# Patient Record
Sex: MALE | Race: WHITE | Employment: STUDENT | URBAN - METROPOLITAN AREA
[De-identification: names, ages, dates, MRNs, and addresses within clinical notes are randomized per-mention and may not be internally consistent; named-entity substitution may affect disease eponyms.]

---

## 2017-12-21 ENCOUNTER — APPOINTMENT (OUTPATIENT)
Dept: GENERAL RADIOLOGY | Age: 22
End: 2017-12-21
Attending: EMERGENCY MEDICINE
Payer: COMMERCIAL

## 2017-12-21 ENCOUNTER — HOSPITAL ENCOUNTER (EMERGENCY)
Age: 22
Discharge: HOME | End: 2017-12-21
Attending: EMERGENCY MEDICINE
Payer: COMMERCIAL

## 2017-12-21 VITALS
BODY MASS INDEX: 19.26 KG/M2 | TEMPERATURE: 97.9 F | WEIGHT: 130 LBS | HEART RATE: 72 BPM | DIASTOLIC BLOOD PRESSURE: 71 MMHG | HEIGHT: 69 IN | SYSTOLIC BLOOD PRESSURE: 114 MMHG | RESPIRATION RATE: 18 BRPM | OXYGEN SATURATION: 100 %

## 2017-12-21 DIAGNOSIS — S43.401A SPRAIN OF RIGHT SHOULDER, UNSPECIFIED SHOULDER SPRAIN TYPE, INITIAL ENCOUNTER: Primary | ICD-10-CM

## 2017-12-21 PROCEDURE — A4565 SLINGS: HCPCS

## 2017-12-21 PROCEDURE — 74011250637 HC RX REV CODE- 250/637: Performed by: EMERGENCY MEDICINE

## 2017-12-21 PROCEDURE — 99283 EMERGENCY DEPT VISIT LOW MDM: CPT

## 2017-12-21 PROCEDURE — 73030 X-RAY EXAM OF SHOULDER: CPT

## 2017-12-21 RX ORDER — IBUPROFEN 600 MG/1
600 TABLET ORAL
Status: COMPLETED | OUTPATIENT
Start: 2017-12-21 | End: 2017-12-21

## 2017-12-21 RX ORDER — IBUPROFEN 600 MG/1
600 TABLET ORAL
Qty: 20 TAB | Refills: 0 | Status: SHIPPED | OUTPATIENT
Start: 2017-12-21

## 2017-12-21 RX ORDER — NAPROXEN 500 MG/1
500 TABLET ORAL 2 TIMES DAILY WITH MEALS
Qty: 20 TAB | Refills: 0 | Status: SHIPPED | OUTPATIENT
Start: 2017-12-21

## 2017-12-21 RX ADMIN — IBUPROFEN 600 MG: 600 TABLET, FILM COATED ORAL at 12:18

## 2017-12-21 NOTE — ED PROVIDER NOTES
HPI Comments: 51-year-old healthy male presents to the emergency room for evaluation of right shoulder pain. Injuries sustained this morning while darkening. Patient was walking when he tripped. In the process of falling patient reached across his body to grab something to brace his fall and felt a pop in her posterior shoulder. Patient continued to. Patient came to the ER for continued pain. Pain is described as sharp and constant. It does not radiate. There is no injury to the head or neck. No loss of consciousness. No numbness or tingling in the extremity. No weakness. Patient has not taken anything for the pain. Pain is currently 8/10. Pain is made worse with movement of his arm posteriorly. No other complaints. Social hx  Nonsmoker  Immunization up to date    Patient is a 23 y.o. male presenting with shoulder injury. The history is provided by the patient. Shoulder Injury    Pertinent negatives include no numbness. History reviewed. No pertinent past medical history. History reviewed. No pertinent surgical history. History reviewed. No pertinent family history. Social History     Social History    Marital status: SINGLE     Spouse name: N/A    Number of children: N/A    Years of education: N/A     Occupational History    Not on file. Social History Main Topics    Smoking status: Never Smoker    Smokeless tobacco: Never Used    Alcohol use No    Drug use: Not on file    Sexual activity: Not on file     Other Topics Concern    Not on file     Social History Narrative    No narrative on file         ALLERGIES: Egg and Augmentin [amoxicillin-pot clavulanate]    Review of Systems   HENT: Negative for trouble swallowing. Eyes: Negative for visual disturbance. Respiratory: Negative for cough and shortness of breath. Cardiovascular: Negative for chest pain and palpitations. Gastrointestinal: Negative for abdominal pain, nausea and vomiting.    Genitourinary: Negative for flank pain. Musculoskeletal: Negative for back pain, neck pain and neck stiffness. Skin: Negative for color change and wound. Neurological: Negative for dizziness, weakness, light-headedness, numbness and headaches. All other systems reviewed and are negative. Vitals:    12/21/17 1104   BP: 136/87   Pulse: 84   Resp: 18   Temp: 97.7 °F (36.5 °C)   SpO2: 100%   Weight: 59 kg (130 lb)   Height: 5' 9\" (1.753 m)            Physical Exam   Constitutional: He is oriented to person, place, and time. He appears well-developed and well-nourished. No distress. HENT:   Head: Normocephalic and atraumatic. Right Ear: External ear normal.   Left Ear: External ear normal.   Eyes: Conjunctivae and EOM are normal. Pupils are equal, round, and reactive to light. Neck: Normal range of motion. Neck supple. No cervical midline tenderness to palpation of Cspine. Cardiovascular: Normal rate and regular rhythm. Pulmonary/Chest: Effort normal. No respiratory distress. Musculoskeletal: He exhibits tenderness. He exhibits no edema. Right Shoulder: no redness, warmth, swelling, no edema, no ecchymosis, no obvious deformity. Pt able to ab/addcut shoulder fully, flex/extend shoulder full. Limited internal rotation due to pain. no difficulty with external rotation. 5/5 flexion/extension/ab/adduction of shoulder. Axillary nerve intact, radial,media,ulnar nerve intact. 5/5  strength, 5/5 ab/addcution of fingers. Cap refill brisk < 3 seconds. Pt neurovascularly intact. Neurological: He is alert and oriented to person, place, and time. No cranial nerve deficit. Coordination normal.   Skin: Skin is warm and dry. No erythema. Psychiatric: He has a normal mood and affect. His behavior is normal. Judgment and thought content normal.   Nursing note and vitals reviewed.        MDM  Number of Diagnoses or Management Options  Sprain of right shoulder, unspecified shoulder sprain type, initial encounter:   Diagnosis management comments: 82-year-old male presented for right shoulder pain. No obvious deformity on exam. Patient has good range of motion. Neurovascularly intact. No midline tenderness to his spine. Plan: X-ray, ibuprofen    1:17 PM  Xray read as no acute process. xrays reviewed with Dr. Brian Nunez. Possible avulsion fracture present. Discussed with patient. Pt placed in sling. He will follow-up with orthopedic doctor for reevaluation. Patient's results have been reviewed with them. Patient and/or family have verbally conveyed their understanding and agreement of the patient's signs, symptoms, diagnosis, treatment and prognosis and additionally agree to follow up as recommended or return to the Emergency Room should their condition change prior to follow-up. Discharge instructions have also been provided to the patient with some educational information regarding their diagnosis as well a list of reasons why they would want to return to the ER prior to their follow-up appointment should their condition change. Amount and/or Complexity of Data Reviewed  Discuss the patient with other providers: yes (ER attending-Kevan)    Patient Progress  Patient progress: stable    ED Course       Procedures      Pt has been seen and evaluated by attending physician who has reviewed lab work and imaging tests. He agrees with discharge and prescription plan.

## 2017-12-21 NOTE — ED TRIAGE NOTES
Pt reports that he was duck hunting this am and tripped and fell onto a log. Presents with right shoulder pain.

## 2017-12-21 NOTE — DISCHARGE INSTRUCTIONS
Shoulder Sprain: Care Instructions  Your Care Instructions    A shoulder sprain occurs when you stretch or tear a ligament in your shoulder. Ligaments are tough tissues that connect one bone to another. A sprain can happen during sports, a fall, or projects around the house. Shoulder sprains usually get better with treatment at home. Follow-up care is a key part of your treatment and safety. Be sure to make and go to all appointments, and call your doctor if you are having problems. It's also a good idea to know your test results and keep a list of the medicines you take. How can you care for yourself at home? · Rest and protect your shoulder. Try to stop or reduce any action that causes pain. · If your doctor gave you a sling or immobilizer, wear it as directed. A sling or immobilizer supports your shoulder and may make you more comfortable. · Put ice or a cold pack on your shoulder for 10 to 20 minutes at a time. Try to do this every 1 to 2 hours for the next 3 days (when you are awake) or until the swelling goes down. Put a thin cloth between the ice and your skin. Some doctors suggest alternating between hot and cold. · Be safe with medicines. Read and follow all instructions on the label. ¨ If the doctor gave you a prescription medicine for pain, take it as prescribed. ¨ If you are not taking a prescription pain medicine, ask your doctor if you can take an over-the-counter medicine. · For the first day or two after an injury, avoid things that might increase swelling, such as hot showers, hot tubs, or hot packs. · After 2 or 3 days, if your swelling is gone, apply a heating pad set on low or a warm cloth to your shoulder. This helps keep your shoulder flexible. Some doctors suggest that you go back and forth between hot and cold. Put a thin cloth between the heating pad and your skin. · Follow your doctor's or physical therapist's directions for exercises.   · Return to your usual level of activity slowly. When should you call for help? Call your doctor now or seek immediate medical care if:  ? · Your pain is worse. ? · You cannot move your shoulder. ? · Your arm is cool or pale or changes color below the shoulder. ? · You have tingling, weakness, or numbness in your arm. ? Watch closely for changes in your health, and be sure to contact your doctor if:  ? · You do not get better as expected. Where can you learn more? Go to http://julio-edgar.info/. Enter W311 in the search box to learn more about \"Shoulder Sprain: Care Instructions. \"  Current as of: March 21, 2017  Content Version: 11.4  © 9642-1700 [employer]. Care instructions adapted under license by [employer] (which disclaims liability or warranty for this information). If you have questions about a medical condition or this instruction, always ask your healthcare professional. [address] any warranty or liability for your use of this information.

## 2017-12-21 NOTE — Clinical Note
Naproxen:1 pill every 12 hours as needed for pain. Keep shoulder elevated for next 48 hours. Place ice in a bag and apply to shoulder for 20 minutes 2-3 times a day for next 48 hours. Return to ER for any redness, warmth, increased swelling, pain.

## 2017-12-21 NOTE — ED NOTES
The patient was discharged home by [facility]  in stable condition. The patient is alert and oriented, in no respiratory distress and discharge vital signs obtained. The patient's diagnosis, condition and treatment were explained. The patient expressed understanding. Two prescriptions given. No work/school note given. A discharge plan has been developed. A  was not involved in the process. Aftercare instructions were given. Pt ambulatory out of the ED with family.